# Patient Record
Sex: FEMALE | Race: WHITE | NOT HISPANIC OR LATINO | URBAN - METROPOLITAN AREA
[De-identification: names, ages, dates, MRNs, and addresses within clinical notes are randomized per-mention and may not be internally consistent; named-entity substitution may affect disease eponyms.]

---

## 2020-08-03 ENCOUNTER — OCCMED (OUTPATIENT)
Dept: URGENT CARE | Facility: CLINIC | Age: 25
End: 2020-08-03

## 2020-08-03 DIAGNOSIS — Z11.59 SCREENING FOR VIRAL DISEASE: Primary | ICD-10-CM

## 2020-08-03 PROCEDURE — U0003 INFECTIOUS AGENT DETECTION BY NUCLEIC ACID (DNA OR RNA); SEVERE ACUTE RESPIRATORY SYNDROME CORONAVIRUS 2 (SARS-COV-2) (CORONAVIRUS DISEASE [COVID-19]), AMPLIFIED PROBE TECHNIQUE, MAKING USE OF HIGH THROUGHPUT TECHNOLOGIES AS DESCRIBED BY CMS-2020-01-R: HCPCS | Performed by: PHYSICIAN ASSISTANT

## 2020-08-05 LAB — SARS-COV-2 RNA SPEC QL NAA+PROBE: NOT DETECTED

## 2020-08-06 ENCOUNTER — TELEPHONE (OUTPATIENT)
Dept: URGENT CARE | Age: 25
End: 2020-08-06

## 2020-08-06 NOTE — TELEPHONE ENCOUNTER
Spoke with patient at length  Discussed negative test results at length  Patient no questions or concerns at this time

## 2020-09-04 ENCOUNTER — OFFICE VISIT (OUTPATIENT)
Dept: URGENT CARE | Facility: CLINIC | Age: 25
End: 2020-09-04

## 2020-09-04 DIAGNOSIS — Z11.59 SCREENING FOR VIRAL DISEASE: Primary | ICD-10-CM

## 2020-09-04 PROCEDURE — U0003 INFECTIOUS AGENT DETECTION BY NUCLEIC ACID (DNA OR RNA); SEVERE ACUTE RESPIRATORY SYNDROME CORONAVIRUS 2 (SARS-COV-2) (CORONAVIRUS DISEASE [COVID-19]), AMPLIFIED PROBE TECHNIQUE, MAKING USE OF HIGH THROUGHPUT TECHNOLOGIES AS DESCRIBED BY CMS-2020-01-R: HCPCS

## 2020-09-06 LAB — SARS-COV-2 RNA SPEC QL NAA+PROBE: NOT DETECTED

## 2020-09-08 ENCOUNTER — TELEPHONE (OUTPATIENT)
Dept: URGENT CARE | Facility: CLINIC | Age: 25
End: 2020-09-08

## 2020-09-08 NOTE — TELEPHONE ENCOUNTER
Spoke with patient and informed negative COVID test results  Copy was also sent to company as this was for her employer  She expresses understanding

## 2024-02-19 ENCOUNTER — TELEPHONE (OUTPATIENT)
Dept: POSTPARTUM | Facility: CLINIC | Age: 29
End: 2024-02-19

## 2024-02-19 NOTE — TELEPHONE ENCOUNTER
"Ursula was  for the first few weeks.  Her latch was \"awful\".  She was diagnosed with tongue tie at about a month of age.  Her Pediatrician did a frenotomy in the office.  Latch did not improve and Rizwana has been almost exclusively pumping.    Ursula was diagnosed with GERD. The pediatrician recommended a trial of formula to help with her fussiness.  Several different formulas were tried. Nothing got better.  She was started on Pepcid.  Rizwana went back to feeding expressed milk and Ursula seems to be doing better.  Rizwana had almost completely weaned when she was formula feeding but now wants to bring production back up because Ursula is doing so well.  Rizwana had been pumping once at night until recently.  She was expressing 3-4 ounces per session.  For the last week, she has been pumping every 2 hours ATC and \"power pumping\" once a day. She is expressing 15-30ml per pumping session.  She has tried Pumping Jolie from [employer].   I suggested Rizwana pump every 3 hours during the day and every 4-5 hours at night.  I explained that more frequently is not necessary and that power pumping is not needed as well.  I reviewed galactagogues that can help increase prolactin levels and aid relactation.  I encouraged her to call back if she is not getting the results she is hoping for.  I offered an appointment for more evaluation and support for pumping or if she desires to attempt direct breastfeeding again.    "

## 2024-02-19 NOTE — TELEPHONE ENCOUNTER
Rizwana called - Bogdan 2m has extreme acid reflux & Ped has recommended that she discontinue with formula and breastfeed.  Rizwana has a stash but is running through quickly since she stopped nursing/pumping & was bottle feeding formula.  She has started to power pump try to bring back - but not getting anything. She is asking for tips/recommendations on getting her supply back.